# Patient Record
Sex: MALE | Race: WHITE | NOT HISPANIC OR LATINO | Employment: UNEMPLOYED | URBAN - NONMETROPOLITAN AREA
[De-identification: names, ages, dates, MRNs, and addresses within clinical notes are randomized per-mention and may not be internally consistent; named-entity substitution may affect disease eponyms.]

---

## 2023-09-17 PROCEDURE — 96374 THER/PROPH/DIAG INJ IV PUSH: CPT

## 2023-09-17 PROCEDURE — 99285 EMERGENCY DEPT VISIT HI MDM: CPT

## 2023-09-17 PROCEDURE — 96375 TX/PRO/DX INJ NEW DRUG ADDON: CPT

## 2023-09-18 ENCOUNTER — HOSPITAL ENCOUNTER (INPATIENT)
Facility: HOSPITAL | Age: 48
LOS: 3 days | Discharge: HOME | DRG: 897 | End: 2023-09-21
Attending: PSYCHIATRY & NEUROLOGY | Admitting: PSYCHIATRY & NEUROLOGY
Payer: MEDICAID

## 2023-09-18 ENCOUNTER — HOSPITAL ENCOUNTER (EMERGENCY)
Facility: HOSPITAL | Age: 48
Discharge: PSYCHIATRIC OR SUBSTANCE USE FACILITY | DRG: 897 | End: 2023-09-18
Attending: STUDENT IN AN ORGANIZED HEALTH CARE EDUCATION/TRAINING PROGRAM
Payer: MEDICAID

## 2023-09-18 VITALS
SYSTOLIC BLOOD PRESSURE: 124 MMHG | TEMPERATURE: 98.5 F | HEART RATE: 88 BPM | WEIGHT: 218 LBS | OXYGEN SATURATION: 98 % | BODY MASS INDEX: 29.53 KG/M2 | RESPIRATION RATE: 18 BRPM | HEIGHT: 72 IN | DIASTOLIC BLOOD PRESSURE: 73 MMHG

## 2023-09-18 DIAGNOSIS — F10.20 UNCOMPLICATED ALCOHOL DEPENDENCE: Primary | ICD-10-CM

## 2023-09-18 PROBLEM — F19.20 CHEMICAL DEPENDENCY: Status: ACTIVE | Noted: 2023-09-18

## 2023-09-18 LAB
ALBUMIN SERPL-MCNC: 4.1 G/DL (ref 3.5–5.2)
ALBUMIN/GLOB SERPL: 1.1 G/DL
ALP SERPL-CCNC: 79 U/L (ref 39–117)
ALT SERPL W P-5'-P-CCNC: 31 U/L (ref 1–41)
AMPHET+METHAMPHET UR QL: NEGATIVE
AMPHETAMINES UR QL: NEGATIVE
ANION GAP SERPL CALCULATED.3IONS-SCNC: 14.4 MMOL/L (ref 5–15)
APAP SERPL-MCNC: <5 MCG/ML (ref 0–30)
AST SERPL-CCNC: 56 U/L (ref 1–40)
BACTERIA UR QL AUTO: ABNORMAL /HPF
BARBITURATES UR QL SCN: NEGATIVE
BASOPHILS # BLD AUTO: 0.11 10*3/MM3 (ref 0–0.2)
BASOPHILS NFR BLD AUTO: 1.5 % (ref 0–1.5)
BENZODIAZ UR QL SCN: NEGATIVE
BILIRUB SERPL-MCNC: 0.4 MG/DL (ref 0–1.2)
BILIRUB UR QL STRIP: NEGATIVE
BUN SERPL-MCNC: 9 MG/DL (ref 6–20)
BUN/CREAT SERPL: 13.8 (ref 7–25)
BUPRENORPHINE SERPL-MCNC: NEGATIVE NG/ML
CALCIUM SPEC-SCNC: 9.6 MG/DL (ref 8.6–10.5)
CANNABINOIDS SERPL QL: POSITIVE
CHLORIDE SERPL-SCNC: 102 MMOL/L (ref 98–107)
CLARITY UR: ABNORMAL
CO2 SERPL-SCNC: 23.6 MMOL/L (ref 22–29)
COCAINE UR QL: NEGATIVE
COLOR UR: YELLOW
CREAT SERPL-MCNC: 0.65 MG/DL (ref 0.76–1.27)
DEPRECATED RDW RBC AUTO: 54 FL (ref 37–54)
EGFRCR SERPLBLD CKD-EPI 2021: 115.5 ML/MIN/1.73
EOSINOPHIL # BLD AUTO: 0.09 10*3/MM3 (ref 0–0.4)
EOSINOPHIL NFR BLD AUTO: 1.2 % (ref 0.3–6.2)
ERYTHROCYTE [DISTWIDTH] IN BLOOD BY AUTOMATED COUNT: 16.8 % (ref 12.3–15.4)
ETHANOL BLD-MCNC: 160 MG/DL (ref 0–10)
ETHANOL BLD-MCNC: 82 MG/DL (ref 0–10)
ETHANOL UR QL: 0.08 %
ETHANOL UR QL: 0.16 %
FENTANYL UR-MCNC: NEGATIVE NG/ML
GLOBULIN UR ELPH-MCNC: 3.9 GM/DL
GLUCOSE SERPL-MCNC: 87 MG/DL (ref 65–99)
GLUCOSE UR STRIP-MCNC: NEGATIVE MG/DL
HAV IGM SERPL QL IA: NORMAL
HBV CORE IGM SERPL QL IA: NORMAL
HBV SURFACE AG SERPL QL IA: NORMAL
HCT VFR BLD AUTO: 26.2 % (ref 37.5–51)
HCV AB SER DONR QL: NORMAL
HGB BLD-MCNC: 8.3 G/DL (ref 13–17.7)
HGB UR QL STRIP.AUTO: NEGATIVE
HYALINE CASTS UR QL AUTO: ABNORMAL /LPF
IMM GRANULOCYTES # BLD AUTO: 0.02 10*3/MM3 (ref 0–0.05)
IMM GRANULOCYTES NFR BLD AUTO: 0.3 % (ref 0–0.5)
KETONES UR QL STRIP: NEGATIVE
LEUKOCYTE ESTERASE UR QL STRIP.AUTO: ABNORMAL
LYMPHOCYTES # BLD AUTO: 1.33 10*3/MM3 (ref 0.7–3.1)
LYMPHOCYTES NFR BLD AUTO: 18.2 % (ref 19.6–45.3)
MAGNESIUM SERPL-MCNC: 2 MG/DL (ref 1.6–2.6)
MCH RBC QN AUTO: 27.8 PG (ref 26.6–33)
MCHC RBC AUTO-ENTMCNC: 31.7 G/DL (ref 31.5–35.7)
MCV RBC AUTO: 87.6 FL (ref 79–97)
METHADONE UR QL SCN: NEGATIVE
MONOCYTES # BLD AUTO: 0.83 10*3/MM3 (ref 0.1–0.9)
MONOCYTES NFR BLD AUTO: 11.4 % (ref 5–12)
NEUTROPHILS NFR BLD AUTO: 4.91 10*3/MM3 (ref 1.7–7)
NEUTROPHILS NFR BLD AUTO: 67.4 % (ref 42.7–76)
NITRITE UR QL STRIP: NEGATIVE
NRBC BLD AUTO-RTO: 0 /100 WBC (ref 0–0.2)
OPIATES UR QL: NEGATIVE
OXYCODONE UR QL SCN: NEGATIVE
PCP UR QL SCN: NEGATIVE
PH UR STRIP.AUTO: 6 [PH] (ref 5–8)
PLATELET # BLD AUTO: 422 10*3/MM3 (ref 140–450)
PMV BLD AUTO: 9.1 FL (ref 6–12)
POTASSIUM SERPL-SCNC: 4.6 MMOL/L (ref 3.5–5.2)
PROPOXYPH UR QL: NEGATIVE
PROT SERPL-MCNC: 8 G/DL (ref 6–8.5)
PROT UR QL STRIP: ABNORMAL
QT INTERVAL: 392 MS
QTC INTERVAL: 435 MS
RBC # BLD AUTO: 2.99 10*6/MM3 (ref 4.14–5.8)
RBC # UR STRIP: ABNORMAL /HPF
REF LAB TEST METHOD: ABNORMAL
SALICYLATES SERPL-MCNC: <0.3 MG/DL
SODIUM SERPL-SCNC: 140 MMOL/L (ref 136–145)
SP GR UR STRIP: 1.02 (ref 1–1.03)
SQUAMOUS #/AREA URNS HPF: ABNORMAL /HPF
TRICYCLICS UR QL SCN: NEGATIVE
UROBILINOGEN UR QL STRIP: ABNORMAL
WBC # UR STRIP: ABNORMAL /HPF
WBC NRBC COR # BLD: 7.29 10*3/MM3 (ref 3.4–10.8)

## 2023-09-18 PROCEDURE — 80179 DRUG ASSAY SALICYLATE: CPT | Performed by: PHYSICIAN ASSISTANT

## 2023-09-18 PROCEDURE — 96374 THER/PROPH/DIAG INJ IV PUSH: CPT

## 2023-09-18 PROCEDURE — 99222 1ST HOSP IP/OBS MODERATE 55: CPT | Performed by: PSYCHIATRY & NEUROLOGY

## 2023-09-18 PROCEDURE — 85025 COMPLETE CBC W/AUTO DIFF WBC: CPT | Performed by: PHYSICIAN ASSISTANT

## 2023-09-18 PROCEDURE — 96375 TX/PRO/DX INJ NEW DRUG ADDON: CPT

## 2023-09-18 PROCEDURE — 25010000002 ONDANSETRON PER 1 MG: Performed by: STUDENT IN AN ORGANIZED HEALTH CARE EDUCATION/TRAINING PROGRAM

## 2023-09-18 PROCEDURE — 80074 ACUTE HEPATITIS PANEL: CPT | Performed by: PSYCHIATRY & NEUROLOGY

## 2023-09-18 PROCEDURE — 93010 ELECTROCARDIOGRAM REPORT: CPT | Performed by: INTERNAL MEDICINE

## 2023-09-18 PROCEDURE — 25010000002 LORAZEPAM PER 2 MG: Performed by: STUDENT IN AN ORGANIZED HEALTH CARE EDUCATION/TRAINING PROGRAM

## 2023-09-18 PROCEDURE — 80053 COMPREHEN METABOLIC PANEL: CPT | Performed by: PHYSICIAN ASSISTANT

## 2023-09-18 PROCEDURE — 93005 ELECTROCARDIOGRAM TRACING: CPT | Performed by: PSYCHIATRY & NEUROLOGY

## 2023-09-18 PROCEDURE — HZ2ZZZZ DETOXIFICATION SERVICES FOR SUBSTANCE ABUSE TREATMENT: ICD-10-PCS | Performed by: PSYCHIATRY & NEUROLOGY

## 2023-09-18 PROCEDURE — 82077 ASSAY SPEC XCP UR&BREATH IA: CPT | Performed by: PHYSICIAN ASSISTANT

## 2023-09-18 PROCEDURE — 83735 ASSAY OF MAGNESIUM: CPT | Performed by: PHYSICIAN ASSISTANT

## 2023-09-18 PROCEDURE — 81001 URINALYSIS AUTO W/SCOPE: CPT | Performed by: PHYSICIAN ASSISTANT

## 2023-09-18 PROCEDURE — 80307 DRUG TEST PRSMV CHEM ANLYZR: CPT | Performed by: PHYSICIAN ASSISTANT

## 2023-09-18 PROCEDURE — 80143 DRUG ASSAY ACETAMINOPHEN: CPT | Performed by: PHYSICIAN ASSISTANT

## 2023-09-18 RX ORDER — SODIUM CHLORIDE 0.9 % (FLUSH) 0.9 %
10 SYRINGE (ML) INJECTION AS NEEDED
Status: DISCONTINUED | OUTPATIENT
Start: 2023-09-18 | End: 2023-09-18 | Stop reason: HOSPADM

## 2023-09-18 RX ORDER — LORAZEPAM 2 MG/1
2 TABLET ORAL
Status: COMPLETED | OUTPATIENT
Start: 2023-09-19 | End: 2023-09-19

## 2023-09-18 RX ORDER — LORAZEPAM 2 MG/1
2 TABLET ORAL EVERY 4 HOURS PRN
Status: DISCONTINUED | OUTPATIENT
Start: 2023-09-19 | End: 2023-09-20

## 2023-09-18 RX ORDER — TRAZODONE HYDROCHLORIDE 50 MG/1
50 TABLET ORAL NIGHTLY PRN
Status: DISCONTINUED | OUTPATIENT
Start: 2023-09-18 | End: 2023-09-21 | Stop reason: HOSPADM

## 2023-09-18 RX ORDER — NICOTINE 21 MG/24HR
1 PATCH, TRANSDERMAL 24 HOURS TRANSDERMAL
Status: DISCONTINUED | OUTPATIENT
Start: 2023-09-18 | End: 2023-09-21 | Stop reason: HOSPADM

## 2023-09-18 RX ORDER — LORAZEPAM 2 MG/1
2 TABLET ORAL
Status: DISPENSED | OUTPATIENT
Start: 2023-09-18 | End: 2023-09-19

## 2023-09-18 RX ORDER — ONDANSETRON 2 MG/ML
4 INJECTION INTRAMUSCULAR; INTRAVENOUS ONCE
Status: COMPLETED | OUTPATIENT
Start: 2023-09-18 | End: 2023-09-18

## 2023-09-18 RX ORDER — LORAZEPAM 0.5 MG/1
0.5 TABLET ORAL
Status: DISCONTINUED | OUTPATIENT
Start: 2023-09-22 | End: 2023-09-20

## 2023-09-18 RX ORDER — ONDANSETRON 4 MG/1
4 TABLET, FILM COATED ORAL EVERY 6 HOURS PRN
Status: DISCONTINUED | OUTPATIENT
Start: 2023-09-18 | End: 2023-09-21 | Stop reason: HOSPADM

## 2023-09-18 RX ORDER — LORAZEPAM 2 MG/ML
1 INJECTION INTRAMUSCULAR ONCE
Status: COMPLETED | OUTPATIENT
Start: 2023-09-18 | End: 2023-09-18

## 2023-09-18 RX ORDER — BENZTROPINE MESYLATE 1 MG/ML
1 INJECTION INTRAMUSCULAR; INTRAVENOUS ONCE AS NEEDED
Status: DISCONTINUED | OUTPATIENT
Start: 2023-09-18 | End: 2023-09-21 | Stop reason: HOSPADM

## 2023-09-18 RX ORDER — LORAZEPAM 1 MG/1
1 TABLET ORAL EVERY 4 HOURS PRN
Status: DISCONTINUED | OUTPATIENT
Start: 2023-09-21 | End: 2023-09-20

## 2023-09-18 RX ORDER — ALUMINA, MAGNESIA, AND SIMETHICONE 2400; 2400; 240 MG/30ML; MG/30ML; MG/30ML
15 SUSPENSION ORAL EVERY 6 HOURS PRN
Status: DISCONTINUED | OUTPATIENT
Start: 2023-09-18 | End: 2023-09-21 | Stop reason: HOSPADM

## 2023-09-18 RX ORDER — LORAZEPAM 0.5 MG/1
0.5 TABLET ORAL EVERY 4 HOURS PRN
Status: DISCONTINUED | OUTPATIENT
Start: 2023-09-22 | End: 2023-09-20

## 2023-09-18 RX ORDER — BENZONATATE 100 MG/1
100 CAPSULE ORAL 3 TIMES DAILY PRN
Status: DISCONTINUED | OUTPATIENT
Start: 2023-09-18 | End: 2023-09-21 | Stop reason: HOSPADM

## 2023-09-18 RX ORDER — FAMOTIDINE 20 MG/1
20 TABLET, FILM COATED ORAL 2 TIMES DAILY PRN
Status: DISCONTINUED | OUTPATIENT
Start: 2023-09-18 | End: 2023-09-21 | Stop reason: HOSPADM

## 2023-09-18 RX ORDER — LORAZEPAM 1 MG/1
1 TABLET ORAL
Status: DISCONTINUED | OUTPATIENT
Start: 2023-09-21 | End: 2023-09-20

## 2023-09-18 RX ORDER — IBUPROFEN 400 MG/1
400 TABLET ORAL EVERY 6 HOURS PRN
Status: DISCONTINUED | OUTPATIENT
Start: 2023-09-18 | End: 2023-09-21 | Stop reason: HOSPADM

## 2023-09-18 RX ORDER — ECHINACEA PURPUREA EXTRACT 125 MG
2 TABLET ORAL AS NEEDED
Status: DISCONTINUED | OUTPATIENT
Start: 2023-09-18 | End: 2023-09-21 | Stop reason: HOSPADM

## 2023-09-18 RX ORDER — BENZTROPINE MESYLATE 1 MG/1
2 TABLET ORAL ONCE AS NEEDED
Status: DISCONTINUED | OUTPATIENT
Start: 2023-09-18 | End: 2023-09-21 | Stop reason: HOSPADM

## 2023-09-18 RX ORDER — LOPERAMIDE HYDROCHLORIDE 2 MG/1
2 CAPSULE ORAL
Status: DISCONTINUED | OUTPATIENT
Start: 2023-09-18 | End: 2023-09-21 | Stop reason: HOSPADM

## 2023-09-18 RX ORDER — HYDROXYZINE 50 MG/1
50 TABLET, FILM COATED ORAL EVERY 6 HOURS PRN
Status: DISCONTINUED | OUTPATIENT
Start: 2023-09-18 | End: 2023-09-21 | Stop reason: HOSPADM

## 2023-09-18 RX ADMIN — SODIUM CHLORIDE 1000 ML: 9 INJECTION, SOLUTION INTRAVENOUS at 02:04

## 2023-09-18 RX ADMIN — HYDROXYZINE HYDROCHLORIDE 50 MG: 50 TABLET ORAL at 21:27

## 2023-09-18 RX ADMIN — LORAZEPAM 2 MG: 2 TABLET ORAL at 21:27

## 2023-09-18 RX ADMIN — ONDANSETRON 4 MG: 2 INJECTION INTRAMUSCULAR; INTRAVENOUS at 02:04

## 2023-09-18 RX ADMIN — IBUPROFEN 400 MG: 400 TABLET, FILM COATED ORAL at 12:56

## 2023-09-18 RX ADMIN — HYDROXYZINE HYDROCHLORIDE 50 MG: 50 TABLET ORAL at 12:56

## 2023-09-18 RX ADMIN — LORAZEPAM 1 MG: 2 INJECTION, SOLUTION INTRAMUSCULAR; INTRAVENOUS at 02:04

## 2023-09-18 NOTE — ED NOTES
MEDICAL SCREENING:    Reason for Visit: requesting detox from [facility]    Patient initially seen in triage.  The patient was advised further evaluation and diagnostic testing will be needed, some of the treatment and testing will be initiated in the lobby in order to begin the process.  The patient will be returned to the waiting area for the time being and possibly be re-assessed by a subsequent ED provider.  The patient will be brought back to the treatment area in as timely manner as possible.       Deandre Fairchild II, PA  09/17/23 5313

## 2023-09-18 NOTE — PLAN OF CARE
Goal Outcome Evaluation:  Plan of Care Reviewed With: patient  Patient Agreement with Plan of Care: agrees     Progress: no change  Outcome Evaluation: Pt new admit to unit this shift.

## 2023-09-18 NOTE — H&P
INITIAL PSYCHIATRIC HISTORY & PHYSICAL    Patient Identification:  Name:  Armond Mackay  Age:  48 y.o.  Sex:  male  :  1975  MRN:  [id number]   Visit Number:  [id number]  Primary Care Physician:  Provider, No Known    SUBJECTIVE    CC/Focus of Exam: alcohol use    HPI: Armond Mackay is a 48 y.o. male who was admitted on 2023 with complaints of alcohol use and withdrawals. The patient reports a long history of substance use. First use was at age 14 when he started drinking any alcohol he could get his hands on, including liquor, beer, malt liquor. Over time the use increased and the patient  continued to use despite negative consequences. The patient endorses symptoms of tolerance and withdrawals. Has tried to cut down and stop but has not been successful. Spends too much time and resources in pursuit of substance use. Longest period of sobriety is reported to be six months.  Currently using a fifth of tequila daily.   Last use was yesterday evening.   Withdrawal symptoms include tremors, stomach discomfort.    PAST PSYCHIATRIC HX: None reported.     SUBSTANCE USE HX: See HPI for alcohol.     SOCIAL HX:   Social History     Socioeconomic History    Marital status: Single   Tobacco Use    Smoking status: Every Day     Packs/day: 1.00     Years: 34.00     Pack years: 34.00     Types: Cigarettes    Smokeless tobacco: Former   Vaping Use    Vaping Use: Never used   Substance and Sexual Activity    Alcohol use: Yes     Comment: half a fifth daily or more    Drug use: Not Currently     Types: Marijuana     Comment: Occasionally hits a joint when drinking and offered marijuana    Sexual activity: Defer         Past Medical History:   Diagnosis Date    Alcohol abuse     Alcoholism           Past Surgical History:   Procedure Laterality Date    ARM WOUND REPAIR / CLOSURE      Lt arm fracture repair    HIP ARTHROPLASTY      SINUS SURGERY      STOMACH SURGERY         History reviewed. No pertinent  family history.      No medications prior to admission.         ALLERGIES:  Patient has no known allergies.    Temp:  [97.7 °F (36.5 °C)-98.5 °F (36.9 °C)] 97.7 °F (36.5 °C)  Heart Rate:  [] 84  Resp:  [18] 18  BP: (124-146)/(73-85) 130/83    REVIEW OF SYSTEMS:  Review of Systems   Constitutional:  Positive for chills, diaphoresis and fatigue.   HENT: Negative.     Eyes: Negative.    Respiratory: Negative.     Cardiovascular: Negative.    Gastrointestinal:  Positive for abdominal pain.   Endocrine: Negative.    Genitourinary:  Positive for difficulty urinating and frequency.   Musculoskeletal: Negative.    Skin: Negative.    Allergic/Immunologic: Negative.    Neurological:  Positive for tremors and weakness.   Hematological: Negative.    Psychiatric/Behavioral:  Positive for dysphoric mood. The patient is nervous/anxious.       OBJECTIVE    PHYSICAL EXAM:  Physical Exam  Constitutional:  Appears well-developed and well-nourished.   HENT:   Head: Normocephalic and atraumatic.   Right Ear: External ear normal.   Left Ear: External ear normal.   Mouth/Throat: Oropharynx is clear and moist.   Eyes: Pupils are equal, round, and reactive to light. Conjunctivae and EOM are normal.   Neck: Normal range of motion. Neck supple.   Cardiovascular: Normal rate, regular rhythm and normal heart sounds.    Respiratory: Effort normal and breath sounds normal. No respiratory distress. No wheezes.   GI: Soft. Bowel sounds are normal.No distension. There is no tenderness.   Musculoskeletal: Normal range of motion. No edema or deformity.   Neurological:No cranial nerve deficit. Coordination normal.   Skin: Skin is warm and dry. Superficial wounds arms and right forehead.      MENTAL STATUS EXAM:   Hygiene:   fair  Cooperation:  Cooperative  Eye Contact:  Fair  Psychomotor Behavior:  Appropriate  Affect:  Appropriate  Hopelessness: Denies  Speech:  Normal  Thought Process: Goal directed  Thought Content:  Normal  Suicidal:   None  Homicidal:  None  Hallucinations:  None  Delusion:  None  Memory:  Intact  Orientation:  Person, Place, Time and Situation  Reliability:  fair  Insight:  Fair  Judgement:  Fair  Impulse Control:  Fair      Imaging Results (Last 24 Hours)       ** No results found for the last 24 hours. **             ECG/EMG Results (most recent)       Procedure Component Value Units Date/Time    ECG 12 Lead Other; Baseline Cardiac Status [[id number]] Collected: 09/18/23 1006     Updated: 09/18/23 1046     QT Interval 392 ms      QTC Interval 435 ms     Narrative:      Test Reason : Other~  Blood Pressure :   */*   mmHG  Vent. Rate :  74 BPM     Atrial Rate :  74 BPM     P-R Int : 156 ms          QRS Dur :  86 ms      QT Int : 392 ms       P-R-T Axes :  50  22  31 degrees     QTc Int : 435 ms    Normal sinus rhythm with sinus arrhythmia  Normal ECG  No previous ECGs available  Confirmed by Nan Joyce ([id number]) on 9/18/2023 10:46:25 AM    Referred By:            Confirmed By: Nan Joyce             Lab Results   Component Value Date    GLUCOSE 87 09/18/2023    BUN 9 09/18/2023    CREATININE 0.65 (L) 09/18/2023    BCR 13.8 09/18/2023    CO2 23.6 09/18/2023    CALCIUM 9.6 09/18/2023    ALBUMIN 4.1 09/18/2023    AST 56 (H) 09/18/2023    ALT 31 09/18/2023       Lab Results   Component Value Date    WBC 7.29 09/18/2023    HGB 8.3 (L) 09/18/2023    HCT 26.2 (L) 09/18/2023    MCV 87.6 09/18/2023     09/18/2023       Last Urine Toxicity          Latest Ref Rng & Units 9/18/2023   LAST URINE TOXICITY RESULTS   Amphetamine, Urine Qual Negative Negative    Barbiturates Screen, Urine Negative Negative    Benzodiazepine Screen, Urine Negative Negative    Buprenorphine, Screen, Urine Negative Negative    Cocaine Screen, Urine Negative Negative    Fentanyl, Urine Negative Negative    Methadone Screen , Urine Negative Negative    Methamphetamine, Ur Negative Negative        Brief Urine Lab Results  (Last result in the  past 365 days)        Color   Clarity   Blood   Leuk Est   Nitrite   Protein   CREAT   Urine HCG        09/18/23 0407 Yellow   Cloudy   Negative   Trace   Negative   Trace                   DATA  Labs reviewed. Creatinine 0.65, AST 56. RBC 2.99, Hemoglobin 8.3, Hematocrit 26.2. UA shows cloudy appearance, trace leukocyte esterase, trace protein, 3-5 WBC. Blood alcohol level 160 mg/dL. UDS positive for thc.   EKG reviewed. QTc 435 ms  JIMMIE reviewed.   Record reviewed. No previous treatment noted in this hospital for mental health or substance use problems.       Strengths: Motivated for treatment    Weaknesses:Substance use and Poor coping skills    Code status:  Full  Discussed code status with patient.    ASSESSMENT & PLAN:        Alcohol use disorder, severe, dependence    Alcohol withdrawal  -Ativan detox  -Thiamine and folate      Nicotine use disorder  -Encouraged cessation. Offered nicotine replacement.      The patient has been admitted for safety and stabilization.  Patient will be monitored for suicidality daily and maintained on Special Precautions Level 4 (q30 min checks).  The patient will have individual and group therapy with a master's level therapist. A master treatment plan will be developed and agreed upon by the patient and his/her treatment team.  The patient's estimated length of stay in the hospital is 5-7 days.

## 2023-09-18 NOTE — NURSING NOTE
[facility] RN contacted at this time for chart review and request of bed assignment. Bed assigned to 43B.

## 2023-09-18 NOTE — ED PROVIDER NOTES
Subjective     History provided by:  Patient  Mental Health Problem  Presenting symptoms: depression    Degree of incapacity (severity):  Moderate  Onset quality:  Gradual  Duration:  1 day  Timing:  Constant  Progression:  Worsening  Chronicity:  Chronic  Context: alcohol use    Treatment compliance:  Untreated  Relieved by:  Nothing  Associated symptoms: no abdominal pain and no chest pain      Review of Systems   Constitutional: Negative.  Negative for fever.   HENT: Negative.     Respiratory: Negative.     Cardiovascular: Negative.  Negative for chest pain.   Gastrointestinal: Negative.  Negative for abdominal pain.   Endocrine: Negative.    Genitourinary: Negative.  Negative for dysuria.   Skin: Negative.    Neurological:  Positive for tremors.   Psychiatric/Behavioral: Negative.     All other systems reviewed and are negative.    Past Medical History:   Diagnosis Date    Alcohol abuse     Alcoholism        No Known Allergies    Past Surgical History:   Procedure Laterality Date    ARM WOUND REPAIR / CLOSURE      Lt arm fracture repair    HIP ARTHROPLASTY      SINUS SURGERY      STOMACH SURGERY         History reviewed. No pertinent family history.    Social History     Socioeconomic History    Marital status: Single   Tobacco Use    Smoking status: Every Day     Packs/day: 1.00     Years: 34.00     Pack years: 34.00     Types: Cigarettes    Smokeless tobacco: Former   Vaping Use    Vaping Use: Never used   Substance and Sexual Activity    Alcohol use: Yes     Comment: half a fifth daily or more    Drug use: Not Currently     Types: Marijuana     Comment: Occasionally hits a joint when drinking and offered marijuana    Sexual activity: Defer           Objective   Physical Exam  Vitals and nursing note reviewed.   Constitutional:       General: He is not in acute distress.     Appearance: He is well-developed. He is not diaphoretic.   HENT:      Head: Normocephalic and atraumatic.      Right Ear: External ear  normal.      Left Ear: External ear normal.      Nose: Nose normal.   Eyes:      Conjunctiva/sclera: Conjunctivae normal.   Neck:      Vascular: No JVD.      Trachea: No tracheal deviation.   Cardiovascular:      Rate and Rhythm: Normal rate.      Heart sounds: No murmur heard.  Pulmonary:      Effort: Pulmonary effort is normal. No respiratory distress.      Breath sounds: No wheezing.   Abdominal:      Palpations: Abdomen is soft.      Tenderness: There is no abdominal tenderness.   Musculoskeletal:         General: No deformity. Normal range of motion.      Cervical back: Normal range of motion and neck supple.   Skin:     General: Skin is warm and dry.      Coloration: Skin is not pale.      Findings: No erythema or rash.   Neurological:      Mental Status: He is alert and oriented to person, place, and time.      Cranial Nerves: No cranial nerve deficit.   Psychiatric:      Comments: Verbalize wanting to get off alcohol.  Patient states the only other illegal substance he uses marijuana.  Patient has had about 4 beers today prior to arrival.       Procedures           ED Course  ED Course as of 09/18/23 0604   Mon Sep 18, 2023   0603 Patient will be accepted for inpatient alcohol detox admission. [RB]      ED Course User Index  [RB] Deandre Fairchild II, PA                                           Medical Decision Making  48-year-old male requesting alcohol detox.    Problems Addressed:  Uncomplicated alcohol dependence: acute illness or injury     Details: Patient has been cleared for treatment at our alcohol detox facility.  Patient will be admitted there for further treatment.    Amount and/or Complexity of Data Reviewed  Labs: ordered.    Risk  Prescription drug management.  Decision regarding hospitalization.        Final diagnoses:   Uncomplicated alcohol dependence       ED Disposition  ED Disposition       ED Disposition   DC/Transfer to Behavioral Health Condition   Stable    Comment   --                No follow-up provider specified.       Medication List      No changes were made to your prescriptions during this visit.            Deandre Fairchild II, PA  09/18/23 0604

## 2023-09-18 NOTE — NURSING NOTE
Spoke to Dr. Ferreira via phone. Intake information provided. Instructed to admit the patient. Admit orders received. SP4 routine orders, Ativan detox protocol RBTOx2. Patient and ED provider made aware of plan of care. Safety precautions maintained.

## 2023-09-18 NOTE — PLAN OF CARE
Goal Outcome Evaluation:  Plan of Care Reviewed With: patient  Patient Agreement with Plan of Care: agrees                  Problem: Adult Behavioral Health Plan of Care  Goal: Plan of Care Review  Recent Flowsheet Documentation  Taken 9/18/2023 1000 by Henrietta Porras RN  Plan of Care Reviewed With: patient  Patient Agreement with Plan of Care: agrees    New admission, Care plan initiated

## 2023-09-18 NOTE — NURSING NOTE
"Intake assessment completed at this time. Pt presents to Intake wishing to detox off alcohol. Pt reports drinking 0.5 or more of a fifth of liquor daily for the last 15 yrs. Pt denies having any stretches of sobriety in this time. Pt reports he works full time and has been caring for his disabled girlfriend for the last 2 yrs. She became sick last week and had to go to the hospital and pt states that he had too much time on his hands and his drinking increased. Pt heard on the radio that alcohol withdrawals can be deadly, so when he decided he needed to stop drinking, he asked a friend if they could recommend a facility. Friend brought pt here. Pt reports drinking only 4 beers yesterday, \"just enough to keep the shakes down.\" Pt has never attempted rehab or detox, one prior psychiatric admission at age 19.     Labs  ETOH 82 down from 160  UDS + THC  Creatinine 0.65  AST 56  RBC 2.99  HGB 8.3  HCT 26.2    Anxiety 4 depression zero craving 8 on scale of 0-10. Sleep & appetite poor.  Pt denies any SI/HI/AVH.    CIWA 11    Meds given in ED: 1L NS Bolus IV, Zofran 4mg IV, Ativan 1mg IV    Pt A&Ox 3.  "

## 2023-09-19 PROCEDURE — 99232 SBSQ HOSP IP/OBS MODERATE 35: CPT | Performed by: PSYCHIATRY & NEUROLOGY

## 2023-09-19 RX ORDER — TAMSULOSIN HYDROCHLORIDE 0.4 MG/1
0.4 CAPSULE ORAL DAILY
Status: DISCONTINUED | OUTPATIENT
Start: 2023-09-19 | End: 2023-09-21 | Stop reason: HOSPADM

## 2023-09-19 RX ADMIN — HYDROXYZINE HYDROCHLORIDE 50 MG: 50 TABLET ORAL at 08:20

## 2023-09-19 RX ADMIN — LORAZEPAM 2 MG: 2 TABLET ORAL at 14:17

## 2023-09-19 RX ADMIN — LORAZEPAM 2 MG: 2 TABLET ORAL at 00:51

## 2023-09-19 RX ADMIN — LORAZEPAM 2 MG: 2 TABLET ORAL at 21:10

## 2023-09-19 RX ADMIN — LORAZEPAM 2 MG: 2 TABLET ORAL at 08:20

## 2023-09-19 RX ADMIN — TAMSULOSIN HYDROCHLORIDE 0.4 MG: 0.4 CAPSULE ORAL at 10:57

## 2023-09-19 RX ADMIN — IBUPROFEN 400 MG: 400 TABLET, FILM COATED ORAL at 08:20

## 2023-09-19 NOTE — PLAN OF CARE
Problem: Adult Behavioral Health Plan of Care  Goal: Plan of Care Review  Outcome: Ongoing, Progressing  Flowsheets  Taken 9/19/2023 1232 by Fred Dennis, RN  Outcome Evaluation: PATIENT DENIES ANY CRAVINGS BUT C/O S/S OF WITH DRAWAL THIS SHIFT. PATIENT IS NOTED TO BE INCONTINENT OF URINE. PATIENT C/O DYSURIA AND HESITANCY URINATING..PATIENT IS UNSTEADY ON HIS FEET AND IS ON FALL PRECAUTIONS. PATIENT IS AOX3 WITH NO S/S OF ACUTE DISTRES NOTED. MD IS AWARE OF THE PATIENTS CONDITION AND COMPLAINTS. NEW ORDERS: FOMAX 0.4MG  Taken 9/19/2023 0844 by Fred Dennis, RN  Plan of Care Reviewed With: patient  Patient Agreement with Plan of Care: agrees  Taken 9/19/2023 0107 by Tushar Bailon RN  Progress: improving   Goal Outcome Evaluation:  Plan of Care Reviewed With: patient  Patient Agreement with Plan of Care: agrees        Outcome Evaluation: PATIENT DENIES ANY CRAVINGS BUT C/O S/S OF WITH DRAWAL THIS SHIFT. PATIENT IS NOTED TO BE INCONTINENT OF URINE. PATIENT C/O DYSURIA AND HESITANCY URINATING..PATIENT IS UNSTEADY ON HIS FEET AND IS ON FALL PRECAUTIONS. PATIENT IS AOX3 WITH NO S/S OF ACUTE DISTRES NOTED. MD IS AWARE OF THE PATIENTS CONDITION AND COMPLAINTS. NEW ORDERS: FOMAX 0.4MG

## 2023-09-19 NOTE — PLAN OF CARE
Goal Outcome Evaluation:        Problem: Adult Behavioral Health Plan of Care  Goal: Patient-Specific Goal (Individualization)  Outcome: Ongoing, Progressing  Flowsheets  Taken 9/19/2023 1200 by Alecia De La Fuente CSW  Patient-Specific Goals (Include Timeframe): Identify 2-3 coping skills, address relapse prevention methods, complete aftercare plans, and deny SI/HI prior to discharge.  Individualized Care Needs: Therapist to offer 1-4 therapy sessions, aftercare planning, safety planning, family education, group therapy, and brief CBT/MI interventions.  Taken 9/19/2023 0927 by Alecia De La Fuente CSW  Patient Personal Strengths:   resilient   resourceful  Patient Vulnerabilities:   substance abuse/addiction   poor impulse control   history of unsuccessful treatment  Taken 9/18/2023 0950 by Henrietta Porras RN  Anxieties, Fears or Concerns: none  Goal: Optimized Coping Skills in Response to Life Stressors  Outcome: Ongoing, Progressing  Flowsheets (Taken 9/19/2023 1200)  Optimized Coping Skills in Response to Life Stressors: making progress toward outcome  Intervention: Promote Effective Coping Strategies  Flowsheets (Taken 9/19/2023 1200)  Supportive Measures:   active listening utilized   counseling provided   decision-making supported   goal-setting facilitated   verbalization of feelings encouraged   self-responsibility promoted   self-reflection promoted   positive reinforcement provided   self-care encouraged  Goal: Develops/Participates in Therapeutic [place] to Support Successful Transition  Outcome: Ongoing, Progressing  Flowsheets (Taken 9/19/2023 1200)  Develops/Participates in Therapeutic [place] to Support Successful Transition: making progress toward outcome  Intervention: Foster Therapeutic [place]  Flowsheets (Taken 9/19/2023 0844 by Fred Dennis, RN)  Trust Relationship/Rapport:   care explained   choices provided   emotional support provided   empathic listening provided   questions answered    questions encouraged   reassurance provided   thoughts/feelings acknowledged  Intervention: Mutually Develop Transition Plan  Flowsheets  Taken 9/19/2023 1200  Outpatient/Agency/Support Group Needs: residential services  Transition Support:   follow-up care discussed   follow-up care coordinated   community resources reviewed   crisis management plan promoted   crisis management plan verbalized  Anticipated Discharge Disposition:   home or self-care   residential substance use unit  Taken 9/19/2023 1158  Discharge Coordination/Progress: Patient is Medicaid pending. Therapist met with patient on this date to complete assessment.  Transportation Anticipated: (to be determined) other (see comments)  Transportation Concerns: none  Current Discharge Risk: substance use/abuse  Concerns to be Addressed:   substance/tobacco abuse/use   cognitive/perceptual   coping/stress   mental health   discharge planning  Readmission Within the Last 30 Days: no previous admission in last 30 days  Patient/Family Anticipated Services at Transition: (to be determined) other (see comments)  Patient's Choice of Community Agency(s): To be determined  Patient/Family Anticipates Transition to: (to be determined) other (see comments)  Offered/Gave Vendor List: yes         DATA:      Therapist met individually with patient this date to introduce role and to discuss hospitalization expectations. Patient agreeable.      Clinical Maneuvering/Intervention:     Therapist assisted patient in processing above session content; acknowledged and normalized patient’s thoughts, feelings, and concerns.  Discussed the therapist/patient relationship and explain the parameters and limitations of relative confidentiality.  Also discussed the importance of active participation, and honesty to the treatment process.  Encouraged the patient to discuss/vent their feelings, frustrations, and fears concerning their ongoing medical issues and validated their feelings.      Discussed the importance of finding enjoyable activities and coping skills that the patient can engage in a regular basis. Discussed healthy coping skills such as distraction, self love, grounding, thought challenges/reframing, etc.  Provided patient with list of healthy coping skills this date. Discussed the importance of medication compliance.  Praised the patient for seeking help and spent the majority of the session building rapport.       Allowed patient to freely discuss issues without interruption or judgment. Provided safe, confidential environment to facilitate the development of positive therapeutic relationship and encourage open, honest communication.      Therapist addressed discharge safety planning this date. Assisted patient in identifying risk factors which would indicate the need for higher level of care after discharge;  including thoughts to harm self or others and/or self-harming behavior. Encouraged patient to call 911, or present to the nearest emergency room should any of these events occur. Discussed crisis intervention services and means to access.  Encouraged securing any objects of harm.       Therapist completed integrated summary, treatment plan, and initiated social history this date.  Therapist is strongly encouraging family involvement in treatment.       ASSESSMENT:      The patient is a 47 y/o  male admitted for alcohol detox treatment. Therapist completed new patient assessment on this date. Patient denies feeling anxious or depressed, denies current SI/HI/AVH. Patient denies experiencing active withdrawal symptoms. No past [facility] admissions. Patient began drinking at age 14, longest period of sobriety has been 6 months. Patient reports that he has been the caregiver of his girlfriend up until recently when she went into a long-term care facility. Therapist recommends [facility] residential rehab and family involvement in treatment.      PLAN:       Patient to remain  hospitalized this date.     Treatment team will focus efforts on stabilizing patient's acute symptoms while providing education on healthy coping and crisis management to reduce hospitalizations.   Patient requires daily psychiatrist evaluation and 24/7 nursing supervision to promote patient  safety.     Therapist will offer 1-4 individual sessions, 1 therapy group daily, family education, and appropriate referral.    Therapist recommends [facility] residential rehab.

## 2023-09-19 NOTE — PROGRESS NOTES
"INPATIENT PSYCHIATRIC PROGRESS NOTE    Name:  Armond Mackay  :  1975  MRN:  [id number]  Visit Number:  [id number]  Length of stay:  1    SUBJECTIVE    CC/Focus of Exam: alcohol use    INTERVAL HISTORY:  The patient reports he is feeling better today and reports no active withdrawals. He reports he has difficulty voiding urine. States he has a history of kidney stones in the past. He states he has taken Flomax in the past and it has helped.   Depression rating 0/10  Anxiety rating 0/10  Sleep:good  Withdrawal sx: none  Cravin/10    Review of Systems   Constitutional: Negative.    Respiratory: Negative.     Cardiovascular: Negative.    Gastrointestinal: Negative.    Genitourinary:  Positive for difficulty urinating.     OBJECTIVE    Temp:  [97.3 °F (36.3 °C)-98.5 °F (36.9 °C)] 97.7 °F (36.5 °C)  Heart Rate:  [] 94  Resp:  [16-18] 18  BP: (119-145)/(76-92) 129/76    MENTAL STATUS EXAM:  Appearance:Casually dressed, good hygeine.   Cooperation:Cooperative  Psychomotor: No psychomotor agitation/retardation, No EPS, No motor tics  Speech-normal rate, amount.  Mood \"good\"   Affect-congruent, appropriate, stable  Thought Content-goal directed, no delusional material present  Thought process-linear, organized.  Suicidality: No SI  Homicidality: No HI  Perception: No AH/VH  Insight-fair   Judgement-fair    Lab Results (last 24 hours)       Procedure Component Value Units Date/Time    Hepatitis Panel, Acute [[id number]]  (Normal) Collected: 23 1527    Specimen: Blood Updated: 23 1707     Hepatitis B Surface Ag Non-Reactive     Hep A IgM Non-Reactive     Hep B C IgM Non-Reactive     Hepatitis C Ab Non-Reactive    Narrative:      Results may be falsely decreased if patient taking Biotin.                Imaging Results (Last 24 Hours)       ** No results found for the last 24 hours. **               ECG/EMG Results (most recent)       Procedure Component Value Units Date/Time    ECG 12 Lead Other; " Baseline Cardiac Status [[id number]] Collected: 09/18/23 1006     Updated: 09/18/23 1046     QT Interval 392 ms      QTC Interval 435 ms     Narrative:      Test Reason : Other~  Blood Pressure :   */*   mmHG  Vent. Rate :  74 BPM     Atrial Rate :  74 BPM     P-R Int : 156 ms          QRS Dur :  86 ms      QT Int : 392 ms       P-R-T Axes :  50  22  31 degrees     QTc Int : 435 ms    Normal sinus rhythm with sinus arrhythmia  Normal ECG  No previous ECGs available  Confirmed by Nan Joyce ([id number]) on 9/18/2023 10:46:25 AM    Referred By:            Confirmed By: Nan Joyce             ALLERGIES: Patient has no known allergies.    Medication Review:   Scheduled Medications:  LORazepam, 2 mg, Oral, 3 times per day   Followed by  [START ON 9/20/2023] LORazepam, 1.5 mg, Oral, 3 times per day   Followed by  [START ON 9/21/2023] LORazepam, 1 mg, Oral, 3 times per day   Followed by  [START ON 9/22/2023] LORazepam, 0.5 mg, Oral, 3 times per day  nicotine, 1 patch, Transdermal, Q24H         PRN Medications:    aluminum-magnesium hydroxide-simethicone    benzonatate    benztropine **OR** benztropine    famotidine    hydrOXYzine    ibuprofen    loperamide    LORazepam **FOLLOWED BY** [START ON 9/20/2023] LORazepam **FOLLOWED BY** [START ON 9/21/2023] LORazepam **FOLLOWED BY** [START ON 9/22/2023] LORazepam    magnesium hydroxide    ondansetron    sodium chloride    traZODone   All medications reviewed.    ASSESSMENT & PLAN:      Alcohol use disorder, severe, dependence    Alcohol withdrawal  -Continue Ativan detox  -Thiamine and folate       Nicotine use disorder  -Encouraged cessation. Offered nicotine replacement.      Dysuria  -Start Flomax.    Special precautions: Special Precautions Level 4 (q30 min checks).    Behavioral Health Treatment Plan and Problem List: I have reviewed and approved the Behavioral Health Treatment Plan and Problem list.  The patient has had a chance to review and agrees with the  treatment plan.    Copied text in portions of this note has been reviewed and is accurate as of 09/19/23         Clinician:  Mary Topete MD  09/19/23  10:04 EDT

## 2023-09-19 NOTE — PLAN OF CARE
Goal Outcome Evaluation:  Plan of Care Reviewed With: patient  Patient Agreement with Plan of Care: agrees     Progress: improving  Outcome Evaluation: Patient cooperative during assessment. Denied cravings, anxiety, depression and SI/HI/AVH. Patient reports concerns for kidney stones, stating that he has frequent urination that is not productive. He also states that he has a pain in his side that has been worsening. He reports sharing these concerns to ER provider prior to admission. Informed patient to infrom MD in AM, and assured I would pass concerns on to day shift.

## 2023-09-20 PROCEDURE — 99232 SBSQ HOSP IP/OBS MODERATE 35: CPT | Performed by: PSYCHIATRY & NEUROLOGY

## 2023-09-20 RX ORDER — LORAZEPAM 0.5 MG/1
0.5 TABLET ORAL EVERY 4 HOURS PRN
Status: ACTIVE | OUTPATIENT
Start: 2023-09-20 | End: 2023-09-21

## 2023-09-20 RX ORDER — LORAZEPAM 0.5 MG/1
0.5 TABLET ORAL
Status: COMPLETED | OUTPATIENT
Start: 2023-09-20 | End: 2023-09-21

## 2023-09-20 RX ADMIN — LORAZEPAM 1.5 MG: 0.5 TABLET ORAL at 08:39

## 2023-09-20 RX ADMIN — LORAZEPAM 0.5 MG: 0.5 TABLET ORAL at 21:06

## 2023-09-20 RX ADMIN — LORAZEPAM 0.5 MG: 0.5 TABLET ORAL at 14:44

## 2023-09-20 RX ADMIN — TAMSULOSIN HYDROCHLORIDE 0.4 MG: 0.4 CAPSULE ORAL at 08:39

## 2023-09-20 NOTE — PLAN OF CARE
Goal Outcome Evaluation:        Problem: Adult Behavioral Health Plan of Care  Goal: Patient-Specific Goal (Individualization)  Outcome: Ongoing, Progressing  Flowsheets  Taken 9/19/2023 1200 by Alecia De La Fuente CSW  Patient-Specific Goals (Include Timeframe): Identify 2-3 coping skills, address relapse prevention methods, complete aftercare plans, and deny SI/HI prior to discharge.  Individualized Care Needs: Therapist to offer 1-4 therapy sessions, aftercare planning, safety planning, family education, group therapy, and brief CBT/MI interventions.  Taken 9/19/2023 0927 by Alecia De La Fuente CSW  Patient Personal Strengths:   resilient   resourceful   motivated for treatment   motivated for recovery  Patient Vulnerabilities:   substance abuse/addiction   poor impulse control   history of unsuccessful treatment  Taken 9/18/2023 0950 by Henrietta Porras RN  Anxieties, Fears or Concerns: none  Goal: Optimized Coping Skills in Response to Life Stressors  Outcome: Ongoing, Progressing  Flowsheets (Taken 9/19/2023 1200)  Optimized Coping Skills in Response to Life Stressors: making progress toward outcome  Intervention: Promote Effective Coping Strategies  Flowsheets (Taken 9/20/2023 1056)  Supportive Measures:   active listening utilized   counseling provided   decision-making supported   goal-setting facilitated   verbalization of feelings encouraged   self-responsibility promoted   positive reinforcement provided   self-reflection promoted   self-care encouraged  Goal: Develops/Participates in Therapeutic Junction to Support Successful Transition  Outcome: Ongoing, Progressing  Flowsheets (Taken 9/19/2023 1200)  Develops/Participates in Therapeutic Junction to Support Successful Transition: making progress toward outcome  Intervention: Foster Therapeutic Junction  Flowsheets (Taken 9/20/2023 0834 by Dorota Sloan, RN)  Trust Relationship/Rapport:   care explained   emotional support provided   choices provided    questions answered   empathic listening provided   reassurance provided   thoughts/feelings acknowledged   questions encouraged  Intervention: Mutually Develop Transition Plan  Flowsheets  Taken 9/20/2023 1055  Discharge Coordination/Progress: Patient is Medicaid pending. Treatment continuing to work with patient on disposition plan and aftercare.  Transportation Anticipated: other (see comments)  Transportation Concerns: none  Current Discharge Risk: substance use/abuse  Concerns to be Addressed:   substance/tobacco abuse/use   cognitive/perceptual   coping/stress   mental health   discharge planning  Readmission Within the Last 30 Days: no previous admission in last 30 days  Patient/Family Anticipated Services at Transition: other (see comments)  Patient's Choice of Community Agency(s): To be determined  Patient/Family Anticipates Transition to: other (see comments)  Offered/Gave Vendor List: yes  Taken 9/19/2023 1200  Outpatient/Agency/Support Group Needs: residential services  Transition Support:   follow-up care discussed   follow-up care coordinated   community resources reviewed   crisis management plan promoted   crisis management plan verbalized  Anticipated Discharge Disposition:   home or self-care   residential substance use unit         DATA:  Therapist met with Patient individually this date. Patient agreeable to discuss current treatment progress and discharge concerns.     Patient signed consent for [facility], can return at discharge.     CLINICAL MANUVERING/INTERVENTIONS:  Assisted Patient in processing session content; acknowledged and normalized Patient’s thoughts, feelings, and concerns by utilizing a person-centered approach in efforts to build appropriate rapport and a positive therapeutic relationship with open and honest communication. Allowed Patient to ventilate regarding current stressors and triggers for negative emotions and thoughts in a safe nonjudgmental environment with  unconditional positive regard, active listening skills, and empathy. Therapist implemented motivational interviewing techniques to assist Patient with exploring personal growth and change and discussed distress tolerance skills, self soothing techniques, and applied cognitive behavioral strategies to facilitate identification of maladaptive patterns of thinking and behavior.Therapist utilized dialectical behavior techniques to teach and model emotional regulation and relaxation methods. Therapist assisted Patient with identifying and implementing healthier coping strategies. Therapist assisted Patient with safety planning; Patient agreed to continue honest communication with Treatment Team while inpatient and identify any SI/HI.  Patient encouraged to seek nearest ER or contact 911 if danger to self or others post discharge.     ASSESSMENT:    Therapist met with patient on this date, patient continues to receive treatment for detox. Patient denies feeling anxious or depressed, denies current SI/HI/AVH or active withdrawal symptoms. Patient is expected to be stable for discharge tomorrow and will return to [facility]. Agency will provide transportation at 09:00. Patient has not been agreeable to family involvement in treatment.     PLAN:   Patient will continue stabilization. Patient will continue to receive services offered by Treatment Team.     Patient to return to [facility] at discharge.

## 2023-09-20 NOTE — PLAN OF CARE
Problem: Adult Behavioral Health Plan of Care  Goal: Plan of Care Review  Outcome: Ongoing, Progressing  Flowsheets  Taken 9/20/2023 1439  Progress: improving  Plan of Care Reviewed With: patient  Patient Agreement with Plan of Care: agrees  Outcome Evaluation: Pt reports good sleep and appetite. Denies SI HI and AVH. Pt denies anxiety and depression. Plan to [facility]  Taken 9/20/2023 0834  Plan of Care Reviewed With: patient  Patient Agreement with Plan of Care: agrees   Goal Outcome Evaluation:  Plan of Care Reviewed With: patient  Patient Agreement with Plan of Care: agrees     Progress: improving  Outcome Evaluation: Pt reports good sleep and appetite. Denies SI HI and AVH. Pt denies anxiety and depression. Plan to [facility] tomorrow [facility]

## 2023-09-20 NOTE — PROGRESS NOTES
Navigator is helping with the following referral:    [facility] - [phone]  -Spoke with Christina. They will accept patient. Cyril would like to pick patient up tomorrow at 9:00am.  9/20

## 2023-09-20 NOTE — PLAN OF CARE
Goal Outcome Evaluation:  Plan of Care Reviewed With: patient  Patient Agreement with Plan of Care: agrees        Outcome Evaluation: Pt reports good appetite and poor sleep. Denies anxiety, depression, SI/HI and AVH. Rates cravings 0/10.

## 2023-09-20 NOTE — DISCHARGE INSTR - APPOINTMENTS
[facility]  [address], [place], [place], [facility], [place]  [phone]    9/21/2023

## 2023-09-20 NOTE — PROGRESS NOTES
"INPATIENT PSYCHIATRIC PROGRESS NOTE    Name:  Armond Mackay  :  1975  MRN:  [id number]  Visit Number:  [id number]  Length of stay:  2    SUBJECTIVE    CC/Focus of Exam: alcohol use    INTERVAL HISTORY:  The patient reports he is feeling better today. Reports no active withdrawals at this time and the medications are helping with the withdrawal symptoms. He reports he is able to void urine better and Flomax is helping.  Depression rating 0/10  Anxiety rating 0/10  Sleep:good  Withdrawal sx: none  Cravin/10    Review of Systems   Constitutional: Negative.    Respiratory: Negative.     Cardiovascular: Negative.    Gastrointestinal: Negative.      OBJECTIVE    Temp:  [97.7 °F (36.5 °C)-98.6 °F (37 °C)] 98.6 °F (37 °C)  Heart Rate:  [] 80  Resp:  [16-18] 16  BP: (107-132)/(68-87) 107/68    MENTAL STATUS EXAM:  Appearance:Casually dressed, good hygeine.   Cooperation:Cooperative  Psychomotor: No psychomotor agitation/retardation, No EPS, No motor tics  Speech-normal rate, amount.  Mood \"good\"   Affect-congruent, appropriate, stable  Thought Content-goal directed, no delusional material present  Thought process-linear, organized.  Suicidality: No SI  Homicidality: No HI  Perception: No AH/VH  Insight-fair   Judgement-fair    Lab Results (last 24 hours)       ** No results found for the last 24 hours. **               Imaging Results (Last 24 Hours)       ** No results found for the last 24 hours. **               ECG/EMG Results (most recent)       Procedure Component Value Units Date/Time    ECG 12 Lead Other; Baseline Cardiac Status [[id number]] Collected: 23 1006     Updated: 23 1046     QT Interval 392 ms      QTC Interval 435 ms     Narrative:      Test Reason : Other~  Blood Pressure :   */*   mmHG  Vent. Rate :  74 BPM     Atrial Rate :  74 BPM     P-R Int : 156 ms          QRS Dur :  86 ms      QT Int : 392 ms       P-R-T Axes :  50  22  31 degrees     QTc Int : 435 ms    Normal " sinus rhythm with sinus arrhythmia  Normal ECG  No previous ECGs available  Confirmed by Nan Joyce () on 2023 10:46:25 AM    Referred By:            Confirmed By: Nan Joyce             ALLERGIES: Patient has no known allergies.    Medication Review:   Scheduled Medications:  LORazepam, 1.5 mg, Oral, 3 times per day   Followed by  [START ON 2023] LORazepam, 1 mg, Oral, 3 times per day   Followed by  [START ON 2023] LORazepam, 0.5 mg, Oral, 3 times per day  nicotine, 1 patch, Transdermal, Q24H  tamsulosin, 0.4 mg, Oral, Daily         PRN Medications:    aluminum-magnesium hydroxide-simethicone    benzonatate    benztropine **OR** benztropine    famotidine    hydrOXYzine    ibuprofen    loperamide    [] LORazepam **FOLLOWED BY** LORazepam **FOLLOWED BY** [START ON 2023] LORazepam **FOLLOWED BY** [START ON 2023] LORazepam    magnesium hydroxide    ondansetron    sodium chloride    traZODone   All medications reviewed.    ASSESSMENT & PLAN:      Alcohol use disorder, severe, dependence    Alcohol withdrawal  -Continue Ativan detox  -Thiamine and folate       Nicotine use disorder  -Encouraged cessation. Offered nicotine replacement.      Dysuria  -Continue Flomax.    Special precautions: Special Precautions Level 4 (q30 min checks).    Behavioral Health Treatment Plan and Problem List: I have reviewed and approved the Behavioral Health Treatment Plan and Problem list.  The patient has had a chance to review and agrees with the treatment plan.    Copied text in portions of this note has been reviewed and is accurate as of 23         Clinician:  Mary Topete MD  23  08:57 EDT

## 2023-09-21 VITALS
HEIGHT: 72 IN | HEART RATE: 101 BPM | SYSTOLIC BLOOD PRESSURE: 109 MMHG | WEIGHT: 218 LBS | DIASTOLIC BLOOD PRESSURE: 77 MMHG | TEMPERATURE: 97.7 F | OXYGEN SATURATION: 98 % | RESPIRATION RATE: 18 BRPM | BODY MASS INDEX: 29.53 KG/M2

## 2023-09-21 PROCEDURE — 99238 HOSP IP/OBS DSCHRG MGMT 30/<: CPT | Performed by: PSYCHIATRY & NEUROLOGY

## 2023-09-21 RX ORDER — TAMSULOSIN HYDROCHLORIDE 0.4 MG/1
0.4 CAPSULE ORAL DAILY
Qty: 30 CAPSULE | Refills: 0 | Status: SHIPPED | OUTPATIENT
Start: 2023-09-22

## 2023-09-21 RX ADMIN — TAMSULOSIN HYDROCHLORIDE 0.4 MG: 0.4 CAPSULE ORAL at 08:02

## 2023-09-21 RX ADMIN — LORAZEPAM 0.5 MG: 0.5 TABLET ORAL at 08:02

## 2023-09-21 NOTE — DISCHARGE SUMMARY
":  1975  MRN:  [id number]  Visit Number:  [id number]      Date of Admission:2023   Date of Discharge:  2023    Discharge Diagnosis:  Active Problems:    Alcohol use disorder, severe, dependence        Admission Diagnosis:  Chemical dependency [F19.20]     HPI  Armond Mackay is a 48 y.o. male who was admitted on 2023 with complaints of alcohol use and withdrawals.   For details please see H&P dated 23.     Hospital Course  Patient is a 48 y.o. male presented with alcohol use and withdrawals. The patient was admitted to the detox recovery unit and started on Ativan detox. He was able to complete the detox early as he didn't experience severe withdrawals. He had difficulty voiding urine with some incontinence. He was started on Flomax and it appeared to help. He was encouraged to follow-up with his pcp and have it further evaluated. The patient was also able to take part in individual and group counseling sessions and work on sobriety and appropriate coping skills.  The patient made steady improvement in his withdrawals and  mood and expressed feeling more positive and hopeful about future. Sleep and appetite were improved.  The day of discharge the patient was calm, cooperative and pleasant. Mood was reported to be good, and denied SI/HI/AVH. Also reported no medication side effects.        Mental Status Exam upon discharge:   Mood \"good\"   Affect-congruent, appropriate, stable  Thought Content-goal directed, no delusional material present  Thought process-linear, organized.  Suicidality: No SI  Homicidality: No HI  Perception: No AH/VH    Procedures Performed         Consults:   Consults       No orders found from 2023 to 2023.            Pertinent Test Results:   Admission on 2023   Component Date Value Ref Range Status    Hepatitis B Surface Ag 2023 Non-Reactive  Non-Reactive Final    Hep A IgM 2023 Non-Reactive  Non-Reactive Final    Hep B C IgM " 09/18/2023 Non-Reactive  Non-Reactive Final    Hepatitis C Ab 09/18/2023 Non-Reactive  Non-Reactive Final    QT Interval 09/18/2023 392  ms Final    QTC Interval 09/18/2023 435  ms Final   Admission on 09/18/2023, Discharged on 09/18/2023   Component Date Value Ref Range Status    Ethanol 09/18/2023 160 (H)  0 - 10 mg/dL Final    Ethanol % 09/18/2023 0.160  % Final    Glucose 09/18/2023 87  65 - 99 mg/dL Final    BUN 09/18/2023 9  6 - 20 mg/dL Final    Creatinine 09/18/2023 0.65 (L)  0.76 - 1.27 mg/dL Final    Sodium 09/18/2023 140  136 - 145 mmol/L Final    Potassium 09/18/2023 4.6  3.5 - 5.2 mmol/L Final    Specimen hemolyzed.  Results may be affected.    Chloride 09/18/2023 102  98 - 107 mmol/L Final    CO2 09/18/2023 23.6  22.0 - 29.0 mmol/L Final    Calcium 09/18/2023 9.6  8.6 - 10.5 mg/dL Final    Total Protein 09/18/2023 8.0  6.0 - 8.5 g/dL Final    Albumin 09/18/2023 4.1  3.5 - 5.2 g/dL Final    ALT (SGPT) 09/18/2023 31  1 - 41 U/L Final    Specimen hemolyzed.  Results may be affected.    AST (SGOT) 09/18/2023 56 (H)  1 - 40 U/L Final    Alkaline Phosphatase 09/18/2023 79  39 - 117 U/L Final    Total Bilirubin 09/18/2023 0.4  0.0 - 1.2 mg/dL Final    Globulin 09/18/2023 3.9  gm/dL Final    A/G Ratio 09/18/2023 1.1  g/dL Final    BUN/Creatinine Ratio 09/18/2023 13.8  7.0 - 25.0 Final    Anion Gap 09/18/2023 14.4  5.0 - 15.0 mmol/L Final    eGFR 09/18/2023 115.5  >60.0 mL/min/1.73 Final    Color, UA 09/18/2023 Yellow  Yellow, Straw Final    Appearance, UA 09/18/2023 Cloudy (A)  Clear Final    pH, UA 09/18/2023 6.0  5.0 - 8.0 Final    Specific Gravity, UA 09/18/2023 1.020  1.005 - 1.030 Final    Glucose, UA 09/18/2023 Negative  Negative Final    Ketones, UA 09/18/2023 Negative  Negative Final    Bilirubin, UA 09/18/2023 Negative  Negative Final    Blood, UA 09/18/2023 Negative  Negative Final    Protein, UA 09/18/2023 Trace (A)  Negative Final    Leuk Esterase, UA 09/18/2023 Trace (A)  Negative Final     Nitrite, UA 09/18/2023 Negative  Negative Final    Urobilinogen, UA 09/18/2023 1.0 E.U./dL  0.2 - 1.0 E.U./dL Final    THC, Screen, Urine 09/18/2023 Positive (A)  Negative Final    Phencyclidine (PCP), Urine 09/18/2023 Negative  Negative Final    Cocaine Screen, Urine 09/18/2023 Negative  Negative Final    Methamphetamine, Ur 09/18/2023 Negative  Negative Final    Opiate Screen 09/18/2023 Negative  Negative Final    Amphetamine Screen, Urine 09/18/2023 Negative  Negative Final    Benzodiazepine Screen, Urine 09/18/2023 Negative  Negative Final    Tricyclic Antidepressants Screen 09/18/2023 Negative  Negative Final    Methadone Screen, Urine 09/18/2023 Negative  Negative Final    Barbiturates Screen, Urine 09/18/2023 Negative  Negative Final    Oxycodone Screen, Urine 09/18/2023 Negative  Negative Final    Propoxyphene Screen 09/18/2023 Negative  Negative Final    Buprenorphine, Screen, Urine 09/18/2023 Negative  Negative Final    Magnesium 09/18/2023 2.0  1.6 - 2.6 mg/dL Final    WBC 09/18/2023 7.29  3.40 - 10.80 10*3/mm3 Final    RBC 09/18/2023 2.99 (L)  4.14 - 5.80 10*6/mm3 Final    Hemoglobin 09/18/2023 8.3 (L)  13.0 - 17.7 g/dL Final    Hematocrit 09/18/2023 26.2 (L)  37.5 - 51.0 % Final    MCV 09/18/2023 87.6  79.0 - 97.0 fL Final    MCH 09/18/2023 27.8  26.6 - 33.0 pg Final    MCHC 09/18/2023 31.7  31.5 - 35.7 g/dL Final    RDW 09/18/2023 16.8 (H)  12.3 - 15.4 % Final    RDW-SD 09/18/2023 54.0  37.0 - 54.0 fl Final    MPV 09/18/2023 9.1  6.0 - 12.0 fL Final    Platelets 09/18/2023 422  140 - 450 10*3/mm3 Final    Neutrophil % 09/18/2023 67.4  42.7 - 76.0 % Final    Lymphocyte % 09/18/2023 18.2 (L)  19.6 - 45.3 % Final    Monocyte % 09/18/2023 11.4  5.0 - 12.0 % Final    Eosinophil % 09/18/2023 1.2  0.3 - 6.2 % Final    Basophil % 09/18/2023 1.5  0.0 - 1.5 % Final    Immature Grans % 09/18/2023 0.3  0.0 - 0.5 % Final    Neutrophils, Absolute 09/18/2023 4.91  1.70 - 7.00 10*3/mm3 Final    Lymphocytes, Absolute  09/18/2023 1.33  0.70 - 3.10 10*3/mm3 Final    Monocytes, Absolute 09/18/2023 0.83  0.10 - 0.90 10*3/mm3 Final    Eosinophils, Absolute 09/18/2023 0.09  0.00 - 0.40 10*3/mm3 Final    Basophils, Absolute 09/18/2023 0.11  0.00 - 0.20 10*3/mm3 Final    Immature Grans, Absolute 09/18/2023 0.02  0.00 - 0.05 10*3/mm3 Final    nRBC 09/18/2023 0.0  0.0 - 0.2 /100 WBC Final    Salicylate 09/18/2023 <0.3  <=30.0 mg/dL Final    Acetaminophen 09/18/2023 <5.0  0.0 - 30.0 mcg/mL Final    Fentanyl, Urine 09/18/2023 Negative  Negative Final    Ethanol 09/18/2023 82 (H)  0 - 10 mg/dL Final    Ethanol % 09/18/2023 0.082  % Final    RBC, UA 09/18/2023 0-2  None Seen, 0-2 /HPF Final    WBC, UA 09/18/2023 3-5 (A)  None Seen, 0-2 /HPF Final    Bacteria, UA 09/18/2023 None Seen  None Seen /HPF Final    Squamous Epithelial Cells, UA 09/18/2023 0-2  None Seen, 0-2 /HPF Final    Hyaline Casts, UA 09/18/2023 None Seen  None Seen /LPF Final    Methodology 09/18/2023 Automated Microscopy   Final        Condition on Discharge:  improved    Vital Signs  Temp:  [97.5 °F (36.4 °C)-98.7 °F (37.1 °C)] 97.7 °F (36.5 °C)  Heart Rate:  [101-106] 101  Resp:  [18] 18  BP: (109-125)/(73-78) 109/77      Discharge Disposition:  Home or Self Care    Discharge Medications:     Discharge Medications        New Medications        Instructions Start Date   tamsulosin 0.4 MG capsule 24 hr capsule  Commonly known as: FLOMAX   0.4 mg, Oral, Daily   Start Date: September 22, 2023              Discharge Diet: Regular     Activity at Discharge: As tolerated      Follow-up Appointments    [facility] [address]  [phone]    Time spent in discharge: < 30 min    Clinician:   Mary Topete MD  09/21/23  09:02 EDT

## 2023-09-21 NOTE — PLAN OF CARE
Goal Outcome Evaluation:  Plan of Care Reviewed With: patient  Patient Agreement with Plan of Care: agrees     Progress: improving  Outcome Evaluation: Patient was calm and cooperative during assessment. He reports sleeping and appetite as good. He rates anxiety as 0/10 and depression as 0/10. Patient denies SI/HI/AVH. Spent some time in the day room watching television and interacting with his peers.  CIWA 1

## 2023-09-21 NOTE — CASE MANAGEMENT/SOCIAL WORK
Case Management/Social Work    Patient Name:  Armond Mackay  YOB: 1975  MRN: [id number]  Admit Date:  9/18/2023    Patient is being discharged back to [facility] on this date, agency providing transportation. Patient reports improvement in mood and withdrawal symptoms, denies current SI/HI/AVH. Therapist has discussed healthy coping skills and relapse prevention with patient. Therapist has assisted patient in identifying risk factors which would indicate the need for higher level of care including thoughts to harm self or others and/or self-harming behavior. Encouraged patient to notify rehab staff, call [phone] or present to the nearest emergency room should any of these events occur.     Electronically signed by:  TACHO Mireles  09/21/23 09:18 EDT

## 2023-09-21 NOTE — PLAN OF CARE
Goal Outcome Evaluation:  Plan of Care Reviewed With: patient  Patient Agreement with Plan of Care: agrees     Progress: improving     Dr. Topete is discharging patient home today.